# Patient Record
Sex: MALE | Race: WHITE | NOT HISPANIC OR LATINO | URBAN - METROPOLITAN AREA
[De-identification: names, ages, dates, MRNs, and addresses within clinical notes are randomized per-mention and may not be internally consistent; named-entity substitution may affect disease eponyms.]

---

## 2019-02-26 ENCOUNTER — EMERGENCY (EMERGENCY)
Facility: HOSPITAL | Age: 59
LOS: 1 days | End: 2019-02-26
Admitting: EMERGENCY MEDICINE
Payer: MEDICAID

## 2019-02-26 PROCEDURE — 72170 X-RAY EXAM OF PELVIS: CPT | Mod: 26

## 2019-02-26 PROCEDURE — 73564 X-RAY EXAM KNEE 4 OR MORE: CPT | Mod: 26,LT

## 2019-02-26 PROCEDURE — 72110 X-RAY EXAM L-2 SPINE 4/>VWS: CPT | Mod: 26

## 2019-02-26 PROCEDURE — 99283 EMERGENCY DEPT VISIT LOW MDM: CPT

## 2022-10-25 ENCOUNTER — OUTPATIENT (OUTPATIENT)
Dept: OUTPATIENT SERVICES | Facility: HOSPITAL | Age: 62
LOS: 1 days | End: 2022-10-25
Payer: MEDICAID

## 2022-10-25 VITALS
SYSTOLIC BLOOD PRESSURE: 137 MMHG | HEIGHT: 72 IN | WEIGHT: 169.98 LBS | HEART RATE: 58 BPM | DIASTOLIC BLOOD PRESSURE: 67 MMHG | TEMPERATURE: 97 F | OXYGEN SATURATION: 96 % | RESPIRATION RATE: 16 BRPM

## 2022-10-25 DIAGNOSIS — S83.241D OTHER TEAR OF MEDIAL MENISCUS, CURRENT INJURY, RIGHT KNEE, SUBSEQUENT ENCOUNTER: ICD-10-CM

## 2022-10-25 DIAGNOSIS — Z98.890 OTHER SPECIFIED POSTPROCEDURAL STATES: Chronic | ICD-10-CM

## 2022-10-25 DIAGNOSIS — Z01.818 ENCOUNTER FOR OTHER PREPROCEDURAL EXAMINATION: ICD-10-CM

## 2022-10-25 LAB
ANION GAP SERPL CALC-SCNC: 5 MMOL/L — SIGNIFICANT CHANGE UP (ref 5–17)
BUN SERPL-MCNC: 13 MG/DL — SIGNIFICANT CHANGE UP (ref 7–23)
CALCIUM SERPL-MCNC: 9.1 MG/DL — SIGNIFICANT CHANGE UP (ref 8.5–10.1)
CHLORIDE SERPL-SCNC: 109 MMOL/L — HIGH (ref 96–108)
CO2 SERPL-SCNC: 27 MMOL/L — SIGNIFICANT CHANGE UP (ref 22–31)
CREAT SERPL-MCNC: 0.62 MG/DL — SIGNIFICANT CHANGE UP (ref 0.5–1.3)
EGFR: 107 ML/MIN/1.73M2 — SIGNIFICANT CHANGE UP
GLUCOSE SERPL-MCNC: 104 MG/DL — HIGH (ref 70–99)
HCT VFR BLD CALC: 45.8 % — SIGNIFICANT CHANGE UP (ref 39–50)
HGB BLD-MCNC: 15.5 G/DL — SIGNIFICANT CHANGE UP (ref 13–17)
MCHC RBC-ENTMCNC: 31.2 PG — SIGNIFICANT CHANGE UP (ref 27–34)
MCHC RBC-ENTMCNC: 33.8 GM/DL — SIGNIFICANT CHANGE UP (ref 32–36)
MCV RBC AUTO: 92.2 FL — SIGNIFICANT CHANGE UP (ref 80–100)
NRBC # BLD: 0 /100 WBCS — SIGNIFICANT CHANGE UP (ref 0–0)
PLATELET # BLD AUTO: 238 K/UL — SIGNIFICANT CHANGE UP (ref 150–400)
POTASSIUM SERPL-MCNC: 4.3 MMOL/L — SIGNIFICANT CHANGE UP (ref 3.5–5.3)
POTASSIUM SERPL-SCNC: 4.3 MMOL/L — SIGNIFICANT CHANGE UP (ref 3.5–5.3)
RBC # BLD: 4.97 M/UL — SIGNIFICANT CHANGE UP (ref 4.2–5.8)
RBC # FLD: 12.4 % — SIGNIFICANT CHANGE UP (ref 10.3–14.5)
SODIUM SERPL-SCNC: 141 MMOL/L — SIGNIFICANT CHANGE UP (ref 135–145)
WBC # BLD: 8.95 K/UL — SIGNIFICANT CHANGE UP (ref 3.8–10.5)
WBC # FLD AUTO: 8.95 K/UL — SIGNIFICANT CHANGE UP (ref 3.8–10.5)

## 2022-10-25 PROCEDURE — 36415 COLL VENOUS BLD VENIPUNCTURE: CPT

## 2022-10-25 PROCEDURE — 93010 ELECTROCARDIOGRAM REPORT: CPT

## 2022-10-25 PROCEDURE — 85027 COMPLETE CBC AUTOMATED: CPT

## 2022-10-25 PROCEDURE — 93005 ELECTROCARDIOGRAM TRACING: CPT

## 2022-10-25 PROCEDURE — G0463: CPT

## 2022-10-25 PROCEDURE — 80048 BASIC METABOLIC PNL TOTAL CA: CPT

## 2022-10-25 NOTE — H&P PST ADULT - NSICDXPASTMEDICALHX_GEN_ALL_CORE_FT
PAST MEDICAL HISTORY:  Asthma     GERD (gastroesophageal reflux disease)     Other tear of medial meniscus, current injury, right knee, subsequent encounter      PAST MEDICAL HISTORY:  Asthma     GERD (gastroesophageal reflux disease)     HLD (hyperlipidemia)     Other tear of medial meniscus, current injury, right knee, subsequent encounter      PAST MEDICAL HISTORY:  Chronic obstructive pulmonary disease     GERD (gastroesophageal reflux disease)     HLD (hyperlipidemia)     Other tear of medial meniscus, current injury, right knee, subsequent encounter

## 2022-10-25 NOTE — H&P PST ADULT - HISTORY OF PRESENT ILLNESS
61 yo male presents to [facility] scheduled for a right knee arthroscopy with possible meniscectomy on 11/4/2022 with Dr. Iyer 61 yo male with COPD, HLD and GERD, presents to PST scheduled for a right knee arthroscopy with possible meniscectomy on 11/4/2022 with Dr. Iyer. Reports H/O a fall 3 years, injuring his right knee. Reports H/O right knee pain. Denies pain today.

## 2022-10-25 NOTE — H&P PST ADULT - FALL HARM RISK - UNIVERSAL INTERVENTIONS
Bed in lowest position, wheels locked, appropriate side rails in place/Call bell, personal items and telephone in reach/Instruct patient to call for assistance before getting out of bed or chair/Non-slip footwear when patient is out of bed/[place] to call system/Physically safe environment - no spills, clutter or unnecessary equipment/Purposeful Proactive Rounding/Room/bathroom lighting operational, light cord in reach

## 2022-10-25 NOTE — H&P PST ADULT - PROBLEM SELECTOR PLAN 2
Labs - CBC, BMP and EKG. COVID PCR 48-72 before DOS.      Pre op and Hibiclens instructions reviewed and given. Take routine am meds am of surgery with sip of water. No meds am of surgery. Instructed to hold and/or avoid other NSAIDs and OTC supplements. Tylenol is ok. Verbalized understanding Labs - CBC, BMP and EKG. COVID PCR 48-72 before DOS.   MC with PCP  Pre op and Hibiclens instructions reviewed and given. Continue routine pm meds night before. No meds am of surgery. Instructed to hold and/or avoid other NSAIDs and OTC supplements. Tylenol is ok. Verbalized understanding

## 2022-10-25 NOTE — H&P PST ADULT - PHYSICIAN'S ASSESSMENT OF RISK
Low Risk Libtayo Pregnancy And Lactation Text: This medication is contraindicated in pregnancy and when breast feeding.

## 2022-12-23 ENCOUNTER — OUTPATIENT (OUTPATIENT)
Dept: OUTPATIENT SERVICES | Facility: HOSPITAL | Age: 62
LOS: 1 days | End: 2022-12-23
Payer: MEDICAID

## 2022-12-23 VITALS
HEART RATE: 59 BPM | HEIGHT: 72 IN | TEMPERATURE: 97 F | WEIGHT: 166.89 LBS | SYSTOLIC BLOOD PRESSURE: 155 MMHG | RESPIRATION RATE: 18 BRPM | DIASTOLIC BLOOD PRESSURE: 75 MMHG | OXYGEN SATURATION: 95 %

## 2022-12-23 DIAGNOSIS — Z01.818 ENCOUNTER FOR OTHER PREPROCEDURAL EXAMINATION: ICD-10-CM

## 2022-12-23 DIAGNOSIS — S83.241D OTHER TEAR OF MEDIAL MENISCUS, CURRENT INJURY, RIGHT KNEE, SUBSEQUENT ENCOUNTER: ICD-10-CM

## 2022-12-23 DIAGNOSIS — S69.92XA UNSPECIFIED INJURY OF LEFT WRIST, HAND AND FINGER(S), INITIAL ENCOUNTER: Chronic | ICD-10-CM

## 2022-12-23 DIAGNOSIS — Z98.890 OTHER SPECIFIED POSTPROCEDURAL STATES: Chronic | ICD-10-CM

## 2022-12-23 DIAGNOSIS — Z98.49 CATARACT EXTRACTION STATUS, UNSPECIFIED EYE: Chronic | ICD-10-CM

## 2022-12-23 DIAGNOSIS — Z90.89 ACQUIRED ABSENCE OF OTHER ORGANS: Chronic | ICD-10-CM

## 2022-12-23 PROBLEM — K21.9 GASTRO-ESOPHAGEAL REFLUX DISEASE WITHOUT ESOPHAGITIS: Chronic | Status: ACTIVE | Noted: 2022-10-25

## 2022-12-23 PROBLEM — E78.5 HYPERLIPIDEMIA, UNSPECIFIED: Chronic | Status: ACTIVE | Noted: 2022-10-25

## 2022-12-23 PROBLEM — J44.9 CHRONIC OBSTRUCTIVE PULMONARY DISEASE, UNSPECIFIED: Chronic | Status: ACTIVE | Noted: 2022-10-25

## 2022-12-23 LAB
ALBUMIN SERPL ELPH-MCNC: 3.8 G/DL — SIGNIFICANT CHANGE UP (ref 3.3–5)
ALP SERPL-CCNC: 78 U/L — SIGNIFICANT CHANGE UP (ref 40–120)
ALT FLD-CCNC: 18 U/L — SIGNIFICANT CHANGE UP (ref 12–78)
ANION GAP SERPL CALC-SCNC: 6 MMOL/L — SIGNIFICANT CHANGE UP (ref 5–17)
AST SERPL-CCNC: 21 U/L — SIGNIFICANT CHANGE UP (ref 15–37)
BILIRUB SERPL-MCNC: 0.4 MG/DL — SIGNIFICANT CHANGE UP (ref 0.2–1.2)
BUN SERPL-MCNC: 9 MG/DL — SIGNIFICANT CHANGE UP (ref 7–23)
CALCIUM SERPL-MCNC: 9.6 MG/DL — SIGNIFICANT CHANGE UP (ref 8.5–10.1)
CHLORIDE SERPL-SCNC: 108 MMOL/L — SIGNIFICANT CHANGE UP (ref 96–108)
CO2 SERPL-SCNC: 28 MMOL/L — SIGNIFICANT CHANGE UP (ref 22–31)
CREAT SERPL-MCNC: 0.78 MG/DL — SIGNIFICANT CHANGE UP (ref 0.5–1.3)
EGFR: 100 ML/MIN/1.73M2 — SIGNIFICANT CHANGE UP
GLUCOSE SERPL-MCNC: 68 MG/DL — LOW (ref 70–99)
HCT VFR BLD CALC: 44 % — SIGNIFICANT CHANGE UP (ref 39–50)
HGB BLD-MCNC: 14.8 G/DL — SIGNIFICANT CHANGE UP (ref 13–17)
MCHC RBC-ENTMCNC: 30.8 PG — SIGNIFICANT CHANGE UP (ref 27–34)
MCHC RBC-ENTMCNC: 33.6 GM/DL — SIGNIFICANT CHANGE UP (ref 32–36)
MCV RBC AUTO: 91.7 FL — SIGNIFICANT CHANGE UP (ref 80–100)
NRBC # BLD: 0 /100 WBCS — SIGNIFICANT CHANGE UP (ref 0–0)
PLATELET # BLD AUTO: 347 K/UL — SIGNIFICANT CHANGE UP (ref 150–400)
POTASSIUM SERPL-MCNC: 4.1 MMOL/L — SIGNIFICANT CHANGE UP (ref 3.5–5.3)
POTASSIUM SERPL-SCNC: 4.1 MMOL/L — SIGNIFICANT CHANGE UP (ref 3.5–5.3)
PROT SERPL-MCNC: 7.3 G/DL — SIGNIFICANT CHANGE UP (ref 6–8.3)
RBC # BLD: 4.8 M/UL — SIGNIFICANT CHANGE UP (ref 4.2–5.8)
RBC # FLD: 12.2 % — SIGNIFICANT CHANGE UP (ref 10.3–14.5)
SODIUM SERPL-SCNC: 142 MMOL/L — SIGNIFICANT CHANGE UP (ref 135–145)
WBC # BLD: 10.14 K/UL — SIGNIFICANT CHANGE UP (ref 3.8–10.5)
WBC # FLD AUTO: 10.14 K/UL — SIGNIFICANT CHANGE UP (ref 3.8–10.5)

## 2022-12-23 PROCEDURE — 36415 COLL VENOUS BLD VENIPUNCTURE: CPT

## 2022-12-23 PROCEDURE — 85027 COMPLETE CBC AUTOMATED: CPT

## 2022-12-23 PROCEDURE — 80053 COMPREHEN METABOLIC PANEL: CPT

## 2022-12-23 PROCEDURE — G0463: CPT

## 2022-12-23 PROCEDURE — 71046 X-RAY EXAM CHEST 2 VIEWS: CPT

## 2022-12-23 PROCEDURE — 71046 X-RAY EXAM CHEST 2 VIEWS: CPT | Mod: 26

## 2022-12-23 NOTE — H&P PST ADULT - PATIENT ON (OXYGEN DELIVERY METHOD)
05/29/2018    Patient Information  George Chavez                                                                                          [id number] [facility] [address]      1957  [phone]      Chief Complaint:     Routine annual physical examination and follow-up lab work.  Complaining of left shoulder pain and decreased range of motion.    History of Present Illness:    The history regarding chronic left shoulder pain:    05/29/2018--patient presents with approximately 6 week history of nontraumatic left shoulder pain that results in difficulty with sleeping and also decreased range of motion.  He had a previous history of adhesive capsulitis but this was the right shoulder.  Examination reveals good passive range of motion.  I doubt adhesive capsulitis and I suspect he has left rotator cuff tendinitis.  Recommended conservative approach with physical therapy.  The patient does not respond and was to proceed with evaluation including MRI and possible orthopedic referral.    Review of Systems   Constitution: Negative.   HENT: Negative.    Eyes: Negative.    Cardiovascular: Negative.    Respiratory: Negative.    Endocrine: Negative.    Hematologic/Lymphatic: Negative.    Skin: Negative.    Musculoskeletal: Negative.         Left shoulder pain   Gastrointestinal: Negative.    Genitourinary: Negative.    Neurological: Negative.    Psychiatric/Behavioral: Negative.    Allergic/Immunologic: Negative.        Active Problems:    Patient Active Problem List   Diagnosis   • Benign essential hypertension   • Lumbar scoliosis   • Hyperlipidemia   • Impaired fasting glucose   • Vitamin D deficiency   • Therapeutic drug monitoring   • Routine physical examination   • Family history of colon cancer   • Myalgia, secondary Lipitor   • Multiple actinic keratoses   • Statin intolerance         Past Medical History:   Diagnosis Date   • Benign essential hypertension 2/15/2016     01/28/2004--treatment for hypertension begun.   • Family history of colon cancer 2/16/2016    Maternal grandfather had colon cancer at age 86.   • History of Palpitations 07/12/2010 07/12/2010--24-hour Holter monitor revealed several PVCs and fewer PACs. No significant arrhythmia. Stress echocardiogram 06/06/2010 normal.   • Hyperlipidemia 2/15/2016    12/27/2010--treatment for hyperlipidemia begun.   • Impaired fasting glucose 2/15/2016    11/01/2007--initial diagnosis.   • Lumbar scoliosis 2/15/2016    09/23/2011--MRI lumbar spine revealed mild to moderate degenerative arthritis, dextro scoliosis with the apex centered around L3 and L4.   • Multiple actinic keratoses 3/23/2017    03/23/2017--1 actinic keratosis on the left forearm, 2 actinic keratoses on the right forearm, were destroyed with liquid nitrogen today.   • Myalgia, secondary Lipitor 3/23/2017    05/04/2017--patient seen in follow-up and reports return of myalgias after changing to 10 mg of Crestor daily.  He questions whether or not 5 mg per day might do the job without myalgias.  After reviewing his NMR profile which was excellent, I think this would be a good idea.  He continues to have myalgias related to statin therapy, another consideration would be Zetia.  03/23/2017--patient seen in follow-up and reports his myalgias have essentially resolved after discontinuation of Lipitor.  We will try generic Crestor 10 mg per day.  Reassess with lab in about 6 weeks.  03/02/2017--patient reports muscular aches and pains, particularly lower extremities possibly related to Lipitor, despite taking coenzyme Q 10 300 mg per day.  Lipitor discontinued.   • Statin intolerance 1/30/2018   • Vitamin D deficiency 2/15/2016         Past Surgical History:   Procedure Laterality Date   • COLONOSCOPY  04/11/2011 04/11/2011--normal colonoscopy.    • COLONOSCOPY  12/10/2005    12/10/2005--normal colonoscopy.   • COLONOSCOPY N/A 7/13/2016 07/13/2016--normal  "colonoscopy to the cecum with a good prep.   • LAPAROSCOPIC CHOLECYSTECTOMY  08/22/2011 08/22/2011--laparoscopic cholecystectomy         No Known Allergies        Current Outpatient Prescriptions:   •  aspirin 81 MG EC tablet, Take by mouth daily., Disp: , Rfl:   •  Cholecalciferol (VITAMIN D-3) 1000 UNITS capsule, Take by mouth., Disp: , Rfl:   •  ezetimibe (ZETIA) 10 MG tablet, TAKE ONE TABLET BY MOUTH DAILY FOR CHOLESEROL, Disp: 30 tablet, Rfl: 2  •  Multiple Vitamin (MULTIVITAMINS PO), Take by mouth daily., Disp: , Rfl:   •  olmesartan-hydrochlorothiazide (BENICAR HCT) 40-12.5 MG per tablet, TAKE 1 TABLET DAILY, Disp: 90 tablet, Rfl: 1  •  TOPROL XL 25 MG 24 hr tablet, TAKE 1 TABLET DAILY, Disp: 90 tablet, Rfl: 1      Family History   Problem Relation Age of Onset   • Heart attack Mother         Mother had a Myocardial Infarction at age 81. Status post PTCA with stents.   • Heart attack Father         Father had a myocardial infarction at age 60.   • Cancer Maternal Grandfather         MGF was diagnosed with colon cancer at age 86         Social History     Social History   • Marital status:      Spouse name: N/A   • Number of children: N/A   • Years of education: N/A     Occupational History   • Civil       Social History Main Topics   • Smoking status: Former Smoker   • Smokeless tobacco: Never Used      Comment: Stopped smoking at age 39.   • Alcohol use Yes      Comment: Modrerate   • Drug use: No   • Sexual activity: Yes     Partners: Female     Other Topics Concern   • Not on file     Social History Narrative   • No narrative on file         Vitals:    05/29/18 0751   BP: 140/78   BP Location: Right arm   Pulse: 83   SpO2: 98%   Weight: 87.6 kg (193 lb 3.2 oz)   Height: 179.1 cm (70.51\")          Physical Exam:    General: Alert and oriented x 3, with appropriate affect; no acute distress.  HEENT: pupils equal, round, and reactive to light; extraocular movements intact; sclera " nonicteric; nasal mucosa normal; pharynx normal; tympanic membranes and ear canals normal.  Neck: without JVD, thyromegaly, bruit, or adenopathy.  Lungs: clear to auscultation in all fields.  Heart: auscultation reveals regular rate and rhythm without murmur, rub, gallop, or click.  Abdomen: is soft and nontender, without hepato-splenomegaly, mass or hernia. Normal bowel sounds; .  Urologic exam: reveals normal male genitalia without testicular mass or penile/scrotal lesion.  Digital rectal exam and Prostate: deferred.  Extremities: are without clubbing, cyanosis, or edema.  Vascular: no signs of peripheral arterial disease or venous insufficiency/varicosities.  Neurological: intact without focal deficit, including cranial and peripheral nerves.  Station and gait observed to be normal during ingress and egress from the examination area.  Sensation and deep tendon reflexes tested if clinically indicated and are normal.  Musculoskeletal: exam is normal, without signs of synovitis, significant degeneration or deformity.  There is good range of motion of the left shoulder passively.  No deformity.  Skin examination: without rash or significant lesions.      Lab/other results:    NMR is absolutely perfect other than LDL particle number slightly elevated at 1028.  CMP normal except blood sugar elevated at 104.  Urinalysis normal.  CBC normal.  Hemoglobin A1c 5.7.  Thyroid function tests normal.  PSA normal at 2.05.  Vitamin D normal.    Assessment/Plan:     Diagnosis Plan   1. Routine physical examination     2. Impaired fasting glucose     3. Hyperlipidemia, unspecified hyperlipidemia type     4. Benign essential hypertension     5. Vitamin D deficiency     6. Family history of colon cancer     7. Statin intolerance     8. Myalgia, secondary Lipitor     9. Therapeutic drug monitoring       Patient presents with essentially normal annual physical except for the following issues: He has very mild impaired fasting glucose  at does not require medication.  Hyperlipidemia is under good control which is fortunate given his statin intolerance.  Blood pressure is adequately controlled.  Vitamin D therapeutic.  Patient has new complaints of left shoulder pain in the exam and history consistent with rotator cuff tendinitis.    Plan is as follows: Prescription for physical therapy given.  Patient wants to go to a facility of choice close by.  No changes in current medical regimen.  Patient will follow-up in one year for his annual exam or follow-up as needed.  Also recommended he check with his insurance regarding the new shingles vaccination.        Procedures         room air

## 2022-12-23 NOTE — H&P PST ADULT - RESPIRATORY
normal/clear to auscultation bilaterally/no wheezes/no rales/no rhonchi normal/clear to auscultation bilaterally/no wheezes/no rales/rhonchi

## 2022-12-23 NOTE — H&P PST ADULT - HISTORY OF PRESENT ILLNESS
This is a 62 yr. old male current smoker with 50 pack years with a PMH of HLD, arthritis and GERD in for Pre surgical testing. He is scheduled for Right Knee Arthroscopy with possible meniscectomy with Dr Enciso on 01/06/2023    Reports refractive joint  pain , had xrays showing bone on bone, had trials of multiple treatment options including physical therapy, steroid injections etc with little relief and after consulting with the surgeon agreed to have the above mentioned procedure.    Denies any numbness or tingling to the extremities at this time.

## 2022-12-23 NOTE — H&P PST ADULT - GASTROINTESTINAL
negative normal/soft/nontender/nondistended normal/soft/nontender/nondistended/normal active bowel sounds

## 2022-12-23 NOTE — H&P PST ADULT - PROBLEM SELECTOR PLAN 2
*COVID testing to be scheduled within 72 hours of surgery. Information provided for test centers.  * Following labs ordered-CBC, CMP  * Following diagnostics ordered-  CXR  * Going for medical clearance with .  * Stop any herbal remedies, vitamin supplements or any medication that contains Aspirin , Ibuprofen, Advil, Motrin or Aleve at least 7 days before surgery.  * Leave all valuables at home, No lotion  on the day of surgery.  * No illegal drugs for 7 days prior to surgery and no alcohol 24 hrs before procedure.  * Preop instructions explained. Verbalized understanding.   * Please take the following meds on the day of surgery with sips of water- Cinimet  * Hibiclens scrub explained and provided.

## 2022-12-23 NOTE — H&P PST ADULT - NSICDXPASTMEDICALHX_GEN_ALL_CORE_FT
PAST MEDICAL HISTORY:  Chronic obstructive pulmonary disease     GERD (gastroesophageal reflux disease)     HLD (hyperlipidemia)     Other tear of medial meniscus, current injury, right knee, subsequent encounter

## 2022-12-23 NOTE — H&P PST ADULT - NSICDXPASTSURGICALHX_GEN_ALL_CORE_FT
PAST SURGICAL HISTORY:  H/O cataract extraction     History of tonsillectomy     Injury of left middle finger     S/P ORIF (open reduction internal fixation) fracture Both ankles

## 2022-12-23 NOTE — H&P PST ADULT - ASSESSMENT
This is a 62 yr. old male current smoker with 50 pack years with a PMH of HLD, arthritis and GERD in for Pre surgical testing. He is scheduled for Right Knee Arthroscopy with possible meniscectomy with Dr Enciso on 01/06/2023

## 2023-01-05 NOTE — ASU PATIENT PROFILE, ADULT - BRADEN SCORE (IF 18 OR LESS ACTIVATE SKIN INJURY RISK INCREASED GUIDELINE), MLM
MRI ADULT DISCHARGE INSTRUCTIONS    You have been medicated today for your scan. Please follow the instructions below to ensure your safe recovery. If you have any questions or problems, feel free to call us at [phone] or [phone].     1.   Have someone stay with you to assist you as needed.    2.   Do not drive or operate any mechanical devices.    3.   Do not perform any activity that requires concentration. Make no major decisions over the next 24 hours.     4.   Be careful changing positions from laying down to sitting or standing, as you may become dizzy.     5.   Do not drink alcohol for 48 hours.    6.   There are no restrictions for eating your normal meals. Drink fluids.    7.   You may continue your usual medications for pain, tranquilizers, muscle relaxants or sedatives when awake.     8.   Tomorrow, you may continue your normal daily activities.     9.   Pressure dressing on 10 - 15 minutes. If swelling or bleeding occurs when removed, continue placing direct pressure on injection site for another 5 minutes, or until bleeding stops.     I have been informed of and understand the above discharge instructions.   
23

## 2023-01-06 ENCOUNTER — OUTPATIENT (OUTPATIENT)
Dept: OUTPATIENT SERVICES | Facility: HOSPITAL | Age: 63
LOS: 1 days | End: 2023-01-06
Payer: MEDICAID

## 2023-01-06 VITALS
WEIGHT: 166.89 LBS | OXYGEN SATURATION: 96 % | TEMPERATURE: 97 F | DIASTOLIC BLOOD PRESSURE: 72 MMHG | RESPIRATION RATE: 14 BRPM | SYSTOLIC BLOOD PRESSURE: 138 MMHG | HEART RATE: 65 BPM | HEIGHT: 72 IN

## 2023-01-06 VITALS
OXYGEN SATURATION: 94 % | HEART RATE: 59 BPM | TEMPERATURE: 98 F | DIASTOLIC BLOOD PRESSURE: 67 MMHG | RESPIRATION RATE: 16 BRPM | SYSTOLIC BLOOD PRESSURE: 142 MMHG

## 2023-01-06 DIAGNOSIS — S69.92XA UNSPECIFIED INJURY OF LEFT WRIST, HAND AND FINGER(S), INITIAL ENCOUNTER: Chronic | ICD-10-CM

## 2023-01-06 DIAGNOSIS — Z98.49 CATARACT EXTRACTION STATUS, UNSPECIFIED EYE: Chronic | ICD-10-CM

## 2023-01-06 DIAGNOSIS — Z98.890 OTHER SPECIFIED POSTPROCEDURAL STATES: Chronic | ICD-10-CM

## 2023-01-06 DIAGNOSIS — Z01.818 ENCOUNTER FOR OTHER PREPROCEDURAL EXAMINATION: ICD-10-CM

## 2023-01-06 DIAGNOSIS — Z90.89 ACQUIRED ABSENCE OF OTHER ORGANS: Chronic | ICD-10-CM

## 2023-01-06 DIAGNOSIS — S83.241D OTHER TEAR OF MEDIAL MENISCUS, CURRENT INJURY, RIGHT KNEE, SUBSEQUENT ENCOUNTER: ICD-10-CM

## 2023-01-06 PROCEDURE — 29880 ARTHRS KNE SRG MNISECTMY M&L: CPT | Mod: RT

## 2023-01-06 PROCEDURE — 88304 TISSUE EXAM BY PATHOLOGIST: CPT

## 2023-01-06 PROCEDURE — 88304 TISSUE EXAM BY PATHOLOGIST: CPT | Mod: 26

## 2023-01-06 RX ORDER — ACETAMINOPHEN 500 MG
1000 TABLET ORAL ONCE
Refills: 0 | Status: COMPLETED | OUTPATIENT
Start: 2023-01-06 | End: 2023-01-06

## 2023-01-06 RX ORDER — SODIUM CHLORIDE 9 MG/ML
1000 INJECTION, SOLUTION INTRAVENOUS
Refills: 0 | Status: DISCONTINUED | OUTPATIENT
Start: 2023-01-06 | End: 2023-01-06

## 2023-01-06 RX ORDER — ONDANSETRON 8 MG/1
4 TABLET, FILM COATED ORAL ONCE
Refills: 0 | Status: DISCONTINUED | OUTPATIENT
Start: 2023-01-06 | End: 2023-01-06

## 2023-01-06 RX ORDER — ASPIRIN/CALCIUM CARB/MAGNESIUM 324 MG
1 TABLET ORAL
Qty: 0 | Refills: 0 | DISCHARGE

## 2023-01-06 RX ORDER — OXYCODONE AND ACETAMINOPHEN 5; 325 MG/1; MG/1
1 TABLET ORAL
Qty: 0 | Refills: 0 | DISCHARGE

## 2023-01-06 RX ORDER — SIMVASTATIN 20 MG/1
1 TABLET, FILM COATED ORAL
Qty: 0 | Refills: 0 | DISCHARGE

## 2023-01-06 RX ORDER — HYDROMORPHONE HYDROCHLORIDE 2 MG/ML
0.5 INJECTION INTRAMUSCULAR; INTRAVENOUS; SUBCUTANEOUS
Refills: 0 | Status: DISCONTINUED | OUTPATIENT
Start: 2023-01-06 | End: 2023-01-06

## 2023-01-06 RX ORDER — CIMETIDINE 200 MG
1 TABLET ORAL
Qty: 0 | Refills: 0 | DISCHARGE

## 2023-01-06 RX ORDER — OXYCODONE HYDROCHLORIDE 5 MG/1
1 TABLET ORAL
Qty: 20 | Refills: 0
Start: 2023-01-06 | End: 2023-01-10

## 2023-01-06 RX ORDER — MONTELUKAST 4 MG/1
1 TABLET, CHEWABLE ORAL
Qty: 0 | Refills: 0 | DISCHARGE

## 2023-01-06 RX ORDER — MORPHINE SULFATE 50 MG/1
4 CAPSULE, EXTENDED RELEASE ORAL ONCE
Refills: 0 | Status: DISCONTINUED | OUTPATIENT
Start: 2023-01-06 | End: 2023-01-06

## 2023-01-06 RX ORDER — UMECLIDINIUM 62.5 UG/1
1 AEROSOL, POWDER ORAL
Qty: 0 | Refills: 0 | DISCHARGE

## 2023-01-06 RX ORDER — CEFAZOLIN SODIUM 1 G
2000 VIAL (EA) INJECTION ONCE
Refills: 0 | Status: COMPLETED | OUTPATIENT
Start: 2023-01-06 | End: 2023-01-06

## 2023-01-06 RX ORDER — ONDANSETRON 8 MG/1
1 TABLET, FILM COATED ORAL
Qty: 18 | Refills: 0
Start: 2023-01-06 | End: 2023-01-08

## 2023-01-06 RX ORDER — DOCUSATE SODIUM 100 MG
1 CAPSULE ORAL
Qty: 12 | Refills: 0
Start: 2023-01-06 | End: 2023-01-08

## 2023-01-06 RX ADMIN — SODIUM CHLORIDE 75 MILLILITER(S): 9 INJECTION, SOLUTION INTRAVENOUS at 09:42

## 2023-01-06 NOTE — ASU DISCHARGE PLAN (ADULT/PEDIATRIC) - CARE PROVIDER_API CALL
Cyrus Iyer (DO)  Orthopaedic Surgery  [address] [place] [address]  Phone: [phone]  Fax: [phone]  Follow Up Time:

## 2023-01-06 NOTE — ASU DISCHARGE PLAN (ADULT/PEDIATRIC) - ASU DC SPECIAL INSTRUCTIONSFT
Post-op Instructions Knee Arthroscopy    POST-OP MEDICATIONS:     PAIN: You were given a prescription for narcotic pain medication. Take this medication as needed and as directed for breakthrough pain. You should try Extra Strength Tylenol first (500mg every 4 hours; not to exceed 3,000mg in 24 hours)/anti-inflammatories (such as Motrin) regularly to help control pain.      BLOOD CLOT PREVENTION: Anti-coagulation is critical to minimize the risk of a DVT (or blood clot).  We recommend you take Aspirin 325mg once daily for 1 week as a precaution unless instructed otherwise.     ICE:  An ice device or ice bag (not directly touching the skin) should be utilized to reduce swelling and pain. Please ice every 3-4 hours for about 15-20 minutes each time until swelling subsides.     AMBULATION: You may weight bear as tolerated after surgery.     WOUND CARE:  Leave your surgical dressing on until instructed by Dr. Iyer.     FOLLOW UP VISIT: If you do not already have a follow-up visit scheduled, then please call the office to schedule one within 7-10 days. Post-op Instructions Knee Arthroscopy    POST-OP MEDICATIONS:     PAIN: You were given a prescription for narcotic pain medication. Take this medication as needed and as directed for breakthrough pain. You should try Extra Strength Tylenol first (500mg every 4 hours; not to exceed 3,000mg in 24 hours)/anti-inflammatories (such as Motrin) regularly to help control pain.      BLOOD CLOT PREVENTION: Anti-coagulation is critical to minimize the risk of a DVT (or blood clot).  We recommend you continue to take your home dose of Aspirin 81mg once daily as a precaution unless instructed otherwise.     ICE:  An ice device or ice bag (not directly touching the skin) should be utilized to reduce swelling and pain. Please ice every 3-4 hours for about 15-20 minutes each time until swelling subsides.     AMBULATION: You may weight bear as tolerated after surgery.     WOUND CARE:  Leave your surgical dressing on until instructed by Dr. Iyer.     FOLLOW UP VISIT: If you do not already have a follow-up visit scheduled, then please call the office to schedule one within 7-10 days.

## 2023-01-06 NOTE — ASU DISCHARGE PLAN (ADULT/PEDIATRIC) - C. MAKE IMPORTANT PERSONAL OR BUSINESS DECISIONS
Statement Selected Consent (Temporal Branch)/Introductory Paragraph: The rationale for Mohs was explained to the patient and consent was obtained. The risks, benefits and alternatives to therapy were discussed in detail. Specifically, the risks of damage to the temporal branch of the facial nerve, infection, scarring, bleeding, prolonged wound healing, incomplete removal, allergy to anesthesia, and recurrence were addressed. Prior to the procedure, the treatment site was clearly identified and confirmed by the patient. All components of Universal Protocol/PAUSE Rule completed.

## 2023-01-06 NOTE — BRIEF OPERATIVE NOTE - NSICDXBRIEFPOSTOP_GEN_ALL_CORE_FT
POST-OP DIAGNOSIS:  Medial meniscus tear 06-Jan-2023 08:50:52  Jung Hayes  Lateral meniscus tear 06-Jan-2023 08:50:48  Jung Hayes

## (undated) DEVICE — PLV-SCD MACHINE: Type: DURABLE MEDICAL EQUIPMENT

## (undated) DEVICE — DRSG COMBINE 5X9"

## (undated) DEVICE — WARMING BLANKET UPPER ADULT

## (undated) DEVICE — PACK KNEE ARTHROSCOPY

## (undated) DEVICE — SOL IRR LR 3000ML

## (undated) DEVICE — S&N ARTHROCARE WAND COBLATION WEREWOLF FLOW 50

## (undated) DEVICE — SUT MONOCRYL 4-0 27" PS-2 UNDYED

## (undated) DEVICE — VENODYNE/SCD SLEEVE CALF LARGE

## (undated) DEVICE — DRSG MASTISOL

## (undated) DEVICE — SHAVER BLADE GREAT WHITE 4.2MM 15DEG BENDABLE

## (undated) DEVICE — NDL SPINAL 18G X 3.5" (PINK)

## (undated) DEVICE — GLV 8.5 PROTEXIS (WHITE)

## (undated) DEVICE — SOL IRR POUR H2O 1000ML

## (undated) DEVICE — TUBING DEPUY MITEK FMS OUTFLOW

## (undated) DEVICE — VENODYNE/SCD SLEEVE CALF MEDIUM

## (undated) DEVICE — DRSG STERISTRIPS 0.5 X 4"

## (undated) DEVICE — SHAVER BLADE GREAT WHITE 4.2MM

## (undated) DEVICE — TUBING DEPUY MITEK FMS VUE INFLOW